# Patient Record
Sex: FEMALE | Race: WHITE | URBAN - METROPOLITAN AREA
[De-identification: names, ages, dates, MRNs, and addresses within clinical notes are randomized per-mention and may not be internally consistent; named-entity substitution may affect disease eponyms.]

---

## 2019-04-24 ENCOUNTER — HOSPITAL ENCOUNTER (OUTPATIENT)
Dept: MEDSURG UNIT | Facility: HOSPITAL | Age: 49
End: 2019-04-26
Attending: SURGERY | Admitting: SURGERY

## 2019-04-24 LAB
ABS NEUT (OLG): 5.21 X10(3)/MCL (ref 2.1–9.2)
ALBUMIN SERPL-MCNC: 4.3 GM/DL (ref 3.4–5)
ALBUMIN/GLOB SERPL: 1.4 RATIO (ref 1–2)
ALP SERPL-CCNC: 143 UNIT/L (ref 45–117)
ALT SERPL-CCNC: 75 UNIT/L (ref 13–56)
APPEARANCE, UA: ABNORMAL
AST SERPL-CCNC: 52 UNIT/L (ref 15–37)
B-HCG SERPL QL: NEGATIVE
BACTERIA SPEC CULT: NORMAL
BASOPHILS # BLD AUTO: 0.05 X10(3)/MCL (ref 0–0.2)
BASOPHILS NFR BLD AUTO: 0.6 % (ref 0–1)
BILIRUB SERPL-MCNC: 0.4 MG/DL (ref 0.2–1)
BILIRUB UR QL STRIP: NEGATIVE
BILIRUBIN DIRECT+TOT PNL SERPL-MCNC: <0.1 MG/DL (ref 0–0.2)
BILIRUBIN DIRECT+TOT PNL SERPL-MCNC: >0.3 MG/DL (ref 0–1)
BUN SERPL-MCNC: 10 MG/DL (ref 7–18)
CALCIUM SERPL-MCNC: 9 MG/DL (ref 8.5–10.1)
CHLORIDE SERPL-SCNC: 105 MMOL/L (ref 98–107)
CO2 SERPL-SCNC: 31 MMOL/L (ref 21–32)
COLOR UR: ABNORMAL
CREAT SERPL-MCNC: 0.86 MG/DL (ref 0.55–1.02)
EOSINOPHIL # BLD AUTO: 0.09 X10(3)/MCL (ref 0–0.9)
EOSINOPHIL NFR BLD AUTO: 1.1 % (ref 0–6.4)
ERYTHROCYTE [DISTWIDTH] IN BLOOD BY AUTOMATED COUNT: 12 % (ref 11.5–17)
GLOBULIN SER-MCNC: 3 GM/DL (ref 2–4)
GLUCOSE (UA): NEGATIVE
GLUCOSE SERPL-MCNC: 105 MG/DL (ref 74–106)
HCT VFR BLD AUTO: 39.5 % (ref 37–47)
HGB BLD-MCNC: 13.7 GM/DL (ref 12–16)
HGB UR QL STRIP: ABNORMAL
IMM GRANULOCYTES # BLD AUTO: 0.03 10*3/UL (ref 0–0.02)
IMM GRANULOCYTES NFR BLD AUTO: 0.4 % (ref 0–0.43)
KETONES UR QL STRIP: NEGATIVE
LEUKOCYTE ESTERASE UR QL STRIP: NEGATIVE
LIPASE SERPL-CCNC: 169 UNIT/L (ref 73–393)
LYMPHOCYTES # BLD AUTO: 2.1 X10(3)/MCL (ref 0.6–4.6)
LYMPHOCYTES NFR BLD AUTO: 26.3 % (ref 16–44)
MCH RBC QN AUTO: 30.1 PG (ref 27–31)
MCHC RBC AUTO-ENTMCNC: 34.7 GM/DL (ref 33–36)
MCV RBC AUTO: 86.8 FL (ref 80–94)
MONOCYTES # BLD AUTO: 0.5 X10(3)/MCL (ref 0.1–1.3)
MONOCYTES NFR BLD AUTO: 6.3 % (ref 4–12.1)
NEUTROPHILS # BLD AUTO: 5.21 X10(3)/MCL (ref 2.1–9.2)
NEUTROPHILS NFR BLD AUTO: 65.3 % (ref 43–73)
NITRITE UR QL STRIP: NEGATIVE
NRBC BLD AUTO-RTO: 0 % (ref 0–0.2)
PH UR STRIP: 8 [PH] (ref 5–7)
PLATELET # BLD AUTO: 318 X10(3)/MCL (ref 130–400)
PMV BLD AUTO: 10 FL (ref 7.4–10.4)
POTASSIUM SERPL-SCNC: 3.4 MMOL/L (ref 3.5–5.1)
PROT SERPL-MCNC: 7.6 GM/DL (ref 6.4–8.2)
PROT UR QL STRIP: ABNORMAL
RBC # BLD AUTO: 4.55 X10(6)/MCL (ref 4.2–5.4)
RBC #/AREA URNS HPF: NORMAL /HPF
SODIUM SERPL-SCNC: 144 MMOL/L (ref 136–145)
SP GR UR STRIP: 1.01 (ref 1–1.03)
SQUAMOUS EPITHELIAL, UA: NORMAL /LPF
TROPONIN I SERPL-MCNC: <0.015 NG/ML (ref 0–0.04)
UROBILINOGEN UR STRIP-ACNC: NEGATIVE
WBC # SPEC AUTO: 8 X10(3)/MCL (ref 4.5–11.5)
WBC #/AREA URNS HPF: NORMAL /HPF

## 2019-04-25 LAB
ABS NEUT (OLG): 9.61 X10(3)/MCL (ref 2.1–9.2)
ALBUMIN SERPL-MCNC: 3.3 GM/DL (ref 3.4–5)
ALBUMIN/GLOB SERPL: 1.3 {RATIO}
ALP SERPL-CCNC: 111 UNIT/L (ref 38–126)
ALT SERPL-CCNC: 59 UNIT/L (ref 12–78)
AST SERPL-CCNC: 33 UNIT/L (ref 15–37)
BASOPHILS # BLD AUTO: 0 X10(3)/MCL (ref 0–0.2)
BASOPHILS NFR BLD AUTO: 0 %
BILIRUB SERPL-MCNC: 1.1 MG/DL (ref 0.2–1)
BILIRUBIN DIRECT+TOT PNL SERPL-MCNC: 0.2 MG/DL (ref 0–0.2)
BILIRUBIN DIRECT+TOT PNL SERPL-MCNC: 0.9 MG/DL (ref 0–0.8)
BUN SERPL-MCNC: 6 MG/DL (ref 7–18)
CALCIUM SERPL-MCNC: 8.5 MG/DL (ref 8.5–10.1)
CHLORIDE SERPL-SCNC: 106 MMOL/L (ref 98–107)
CO2 SERPL-SCNC: 26 MMOL/L (ref 21–32)
CREAT SERPL-MCNC: 0.69 MG/DL (ref 0.55–1.02)
EOSINOPHIL # BLD AUTO: 0.2 X10(3)/MCL (ref 0–0.9)
EOSINOPHIL NFR BLD AUTO: 1 %
ERYTHROCYTE [DISTWIDTH] IN BLOOD BY AUTOMATED COUNT: 12.1 % (ref 11.5–17)
GLOBULIN SER-MCNC: 2.5 GM/DL (ref 2.4–3.5)
GLUCOSE SERPL-MCNC: 109 MG/DL (ref 74–106)
HCT VFR BLD AUTO: 36.6 % (ref 37–47)
HGB BLD-MCNC: 12 GM/DL (ref 12–16)
LYMPHOCYTES # BLD AUTO: 1.6 X10(3)/MCL (ref 0.6–4.6)
LYMPHOCYTES NFR BLD AUTO: 12 %
MCH RBC QN AUTO: 29.3 PG (ref 27–31)
MCHC RBC AUTO-ENTMCNC: 32.8 GM/DL (ref 33–36)
MCV RBC AUTO: 89.5 FL (ref 80–94)
MONOCYTES # BLD AUTO: 1 X10(3)/MCL (ref 0.1–1.3)
MONOCYTES NFR BLD AUTO: 8 %
NEUTROPHILS # BLD AUTO: 9.61 X10(3)/MCL (ref 2.1–9.2)
NEUTROPHILS NFR BLD AUTO: 77 %
PLATELET # BLD AUTO: 247 X10(3)/MCL (ref 130–400)
PMV BLD AUTO: 10.5 FL (ref 9.4–12.4)
POTASSIUM SERPL-SCNC: 3.7 MMOL/L (ref 3.5–5.1)
PROT SERPL-MCNC: 5.8 GM/DL (ref 6.4–8.2)
RBC # BLD AUTO: 4.09 X10(6)/MCL (ref 4.2–5.4)
SODIUM SERPL-SCNC: 137 MMOL/L (ref 136–145)
WBC # SPEC AUTO: 12.5 X10(3)/MCL (ref 4.5–11.5)

## 2019-04-26 LAB
ABS NEUT (OLG): 9.13 X10(3)/MCL (ref 2.1–9.2)
ALBUMIN SERPL-MCNC: 3.1 GM/DL (ref 3.4–5)
ALBUMIN/GLOB SERPL: 1.1 RATIO (ref 1.1–2)
ALP SERPL-CCNC: 99 UNIT/L (ref 38–126)
ALT SERPL-CCNC: 104 UNIT/L (ref 12–78)
AST SERPL-CCNC: 78 UNIT/L (ref 15–37)
BASOPHILS # BLD AUTO: 0 X10(3)/MCL (ref 0–0.2)
BASOPHILS NFR BLD AUTO: 0 %
BILIRUB SERPL-MCNC: 0.7 MG/DL (ref 0.2–1)
BILIRUBIN DIRECT+TOT PNL SERPL-MCNC: 0.2 MG/DL (ref 0–0.5)
BILIRUBIN DIRECT+TOT PNL SERPL-MCNC: 0.5 MG/DL (ref 0–0.8)
BUN SERPL-MCNC: 10 MG/DL (ref 7–18)
CALCIUM SERPL-MCNC: 8.6 MG/DL (ref 8.5–10.1)
CHLORIDE SERPL-SCNC: 106 MMOL/L (ref 98–107)
CO2 SERPL-SCNC: 27 MMOL/L (ref 21–32)
CREAT SERPL-MCNC: 0.72 MG/DL (ref 0.55–1.02)
EOSINOPHIL # BLD AUTO: 0 X10(3)/MCL (ref 0–0.9)
EOSINOPHIL NFR BLD AUTO: 0 %
ERYTHROCYTE [DISTWIDTH] IN BLOOD BY AUTOMATED COUNT: 11.9 % (ref 11.5–17)
GLOBULIN SER-MCNC: 2.8 GM/DL (ref 2.4–3.5)
GLUCOSE SERPL-MCNC: 93 MG/DL (ref 74–106)
HCT VFR BLD AUTO: 33.7 % (ref 37–47)
HGB BLD-MCNC: 11.1 GM/DL (ref 12–16)
LYMPHOCYTES # BLD AUTO: 2 X10(3)/MCL (ref 0.6–4.6)
LYMPHOCYTES NFR BLD AUTO: 16 %
MCH RBC QN AUTO: 29.6 PG (ref 27–31)
MCHC RBC AUTO-ENTMCNC: 32.9 GM/DL (ref 33–36)
MCV RBC AUTO: 89.9 FL (ref 80–94)
MONOCYTES # BLD AUTO: 1 X10(3)/MCL (ref 0.1–1.3)
MONOCYTES NFR BLD AUTO: 8 %
NEUTROPHILS # BLD AUTO: 9.13 X10(3)/MCL (ref 2.1–9.2)
NEUTROPHILS NFR BLD AUTO: 75 %
PLATELET # BLD AUTO: 223 X10(3)/MCL (ref 130–400)
PMV BLD AUTO: 11 FL (ref 9.4–12.4)
POTASSIUM SERPL-SCNC: 3.9 MMOL/L (ref 3.5–5.1)
PROT SERPL-MCNC: 5.9 GM/DL (ref 6.4–8.2)
RBC # BLD AUTO: 3.75 X10(6)/MCL (ref 4.2–5.4)
SODIUM SERPL-SCNC: 141 MMOL/L (ref 136–145)
WBC # SPEC AUTO: 12.2 X10(3)/MCL (ref 4.5–11.5)

## 2022-04-30 NOTE — OP NOTE
DATE OF SURGERY:    04/25/2019    SURGEON:  Alex Cerda MD  ASSISTANT:  Karlene Burkett    PROCEDURE:  Laparoscopic cholecystectomy.    PREOPERATIVE DIAGNOSIS:  Acute cholecystitis with cholecystolithiasis.    POSTOPERATIVE DIAGNOSIS:  Acute cholecystitis with cholecystolithiasis.    ANESTHESIA:  General endotracheal anesthesia with local at sites.    COMPLICATIONS:  None.    ESTIMATED BLOOD LOSS:  50.    CONDITION:  Good.    SPECIMEN:  Gallbladder.    INDICATIONS FOR PROCEDURE:  This is a 48-year-old female with large stones and signs of acute cholecystitis both clinically and on imaging.  She was taken to the operating room for laparoscopic cholecystectomy.    FINDINGS:  Acutely inflamed gallbladder with edema and several greater than 1 cm stones noted and otherwise normal anatomy.    PROCEDURE IN DETAIL:  The patient was brought to the operating room.  She was brought under general endotracheal anesthesia.  She was prepped and draped in sterile fashion.  Antibiotics were given.  SCDs were placed.  Time out was called.  Supraumbilical Veress access to the abdomen was performed with water drop test.  There was no evidence of bowel or trocar injury.  The abdomen was insufflated to 15 mmHg.  Three 5 mm ports and a subxiphoid 11 mm port were placed.  The gallbladder was retracted.  It was very inflamed and distended.  Suction decompression needle was performed in order to grasp the gallbladder more adequately.  The triangle of Calot was dissected out.  There was an obvious cystic artery and cystic duct.  We also clipped the posterior branch of the cystic artery going to the R lateral GB bed.  The cystic artery was clipped twice proximally, once distally, and transected.  The cystic duct was clipped 3 times proximally, once distally, and transected.  The distal side of the posterior branch of the cystic duct was bovied.  The gallbladder was removed from the gallbladder bed with Bovie cautery.  The gallbladder was  removed from the abdomen with the Endo Catch bag.  The liver bed was dry.  The clips were intact.  The remainder of the abdomen appeared normal.  The abdomen was desufflated.  The port sites were removed.  The port sites were closed with 4-0 Vicryl.  The subxiphoid port was closed with 0 Vicryl.  The patient tolerated the procedure well, was awoken without difficulty, and brought to recovery without further event.        ______________________________  MD AQUILES Villagomez/SHIELA  DD:  04/25/2019  Time:  11:37AM  DT:  04/25/2019  Time:  11:52AM  Job #:  [id number]

## 2022-04-30 NOTE — PROGRESS NOTES
Patient:   Nicki Coreas            MRN: [id number]            FIN: [id number]               Age:   48 years     Sex:  Female     :  1970   Associated Diagnoses:   None   Author:   Sailaja Yap      Mrs. Coreas is a 48yoF who is here today for routine post op visit s/p lap joao .  She has been doing well overall since discharge.  She is having issues with constipation, but denies N/V, food intolerance, and diarrhea.  She also reports that her pain is much improved and she is only using ibuprofen sparingly.     Gen: NAD  Card; RRR  Pulm: non labored  Abd: soft, NT/ND, incisons with steri strips still in place    Final Diagnosis  GALLBLADDER, CHOLECYSTECTOMY:    MODERATE ACUTE CHOLECYSTITIS.    CHOLELITHIASIS.    CODE 6    *Electronically Signed*     José Manuel Mancera MD  Verified: 19 10:36      A/P 48yoF s/p lap joao     - avoid hevy lifting for two additional weeks  - RTC PRN    Sailaja Wise PA-C

## 2022-04-30 NOTE — ED PROVIDER NOTES
Patient:   Nicki Coreas            MRN: [id number]            FIN: [id number]               Age:   48 years     Sex:  Female     :  1970   Associated Diagnoses:   Abdominal pain; Cholelithiasis; Nausea; Positive Galan's Sign; Abdominal pain   Author:   Lorraine Cabrera MD      Basic Information   Additional information: Chief Complaint from Nursing Triage Note : Chief Complaint   2019 7:28 CDT       Chief Complaint           c/o bilat upper quad abd pain radiating to back onset 0400 this am.  .      History of Present Illness   The patient presents with abdominal pain.  The onset was 3.5  hours ago.  The course/duration of symptoms is constant.  The character of symptoms is achy.  The degree at onset was moderate.  The Location of pain at onset was bilateral, upper and abdominal.  Radiating pain: back. The relieving factor is none.  Associated symptoms: nausea.        Review of Systems   Constitutional symptoms:  Negative except as documented in HPI.   Skin symptoms:  Negative except as documented in HPI.   Eye symptoms:  Negative except as documented in HPI.   ENMT symptoms:  Negative except as documented in HPI.   Respiratory symptoms:  Negative except as documented in HPI.   Cardiovascular symptoms:  Negative except as documented in HPI.   Gastrointestinal symptoms:  Negative except as documented in HPI.   Genitourinary symptoms:  Negative except as documented in HPI.   Musculoskeletal symptoms:  Negative except as documented in HPI.   Neurologic symptoms:  Negative except as documented in HPI.             Additional review of systems information: All other systems reviewed and otherwise negative.      Health Status   Allergies:    Allergic Reactions (Selected)  No Known Allergies,    Allergies (1) Active Reaction  No Known Allergies None Documented.   Medications:  (Selected)   Prescriptions  Prescribed  prednisONE 20 mg oral tablet: 20 mg = 1 tab(s), Oral, BID, # 10 tab(s), 0  Refill(s)  triamcinolone topical 0.1% cream: 1 lui, TOP, TID, Apply sufficient amount to affected area as needed. Use for no longer than 2 consecutive weeks at a time., # 15 gm, 2 Refill(s)  Documented Medications  Documented  Paxil CR 25 mg oral tablet, extended release: 25 mg = 1 tab(s), Oral, qAM, # 30 tab(s), 0 Refill(s).      Past Medical/ Family/ Social History   Medical history: Negative.   Surgical history:    hyst..   Family history:    No family history items have been selected or recorded..   Social history:    Social & Psychosocial Habits    Alcohol  04/24/2019  Use: Never    Substance Abuse  04/24/2019  Use: Current    Type: Marijuana    Frequency: 1-2 times per week    Tobacco  04/24/2019  Use: Never (less than 100 in l    Patient Wants Consult For Cessation Counseling N/A  .   Problem list:    Active Problems (1)  IBS - Irritable bowel syndrome .      Physical Examination               Vital Signs             Time:  11/23/2015 07:10:00.   Vital Signs   4/24/2019 7:28 CDT       Temperature Oral          36.6 DegC                             Temperature Oral (calculated)             97.88 DegF                             Peripheral Pulse Rate     106 bpm  HI                             Respiratory Rate          20 br/min                             SpO2                      99 %                             Oxygen Therapy            Room air                             Systolic Blood Pressure   182 mmHg  HI                             Diastolic Blood Pressure  87 mmHg  .   Measurements   4/24/2019 7:28 CDT       Weight Dosing             54 kg                             Weight Measured and Calculated in Lbs     119.05 lb                             Weight Estimated          54 kg                             Height/Length Dosing      164 cm                             Height/Length Estimated   164 cm                             Body Mass Index Estimated 20.08 kg/m2  .   General:  Alert, no acute  distress.    Skin:  Warm, dry.    Head:  Normocephalic, atraumatic.    Neck:  Trachea midline.   Eye:  Normal conjunctiva.   Ears, nose, mouth and throat:  Oral mucosa moist.   Cardiovascular:  Regular rate and rhythm, No murmur, No edema.    Respiratory:  Lungs are clear to auscultation, respirations are non-labored, breath sounds are equal.    Gastrointestinal:  Non distended, Tenderness: Moderate, right upper quadrant, Bowel sounds: Normal, Trauma: Negative, Signs: Galan's.    Neurological:  Alert and oriented to person, place, time, and situation, normal speech observed.    Psychiatric:  Cooperative, appropriate mood & affect.       Medical Decision Making   Orders  Launch Order Profile (Selected)   Inpatient Orders  Ordered  EKG Adult 12 Lead: 04/24/19 7:35:00 CDT, Stat, Abdominal Pain Epigastric, 04/24/19 7:35:00 CDT, EKG if >30 years of age and pain at or above umbilicus or history of ischemic heart disease., -1, -1, 04/24/19 7:35:00 CDT  NPO: 04/24/19 7:35:00 CDT, CM NPO  Saline Lock Insert: 04/24/19 7:35:00 CDT, Stop date 04/24/19 7:35:00 CDT  Vital Signs Notify Provider: 04/24/19 7:35:00 CDT, Immediately for BP <90/60., SBP < 90, DBP < 60  Ordered (Dispatched)  CBC w/ Auto Diff: Stat collect, 04/24/19 7:35:00 CDT, Blood, Stop date 04/24/19 7:35:00 CDT, Lab Collect, 04/24/19 7:35:00 CDT  CMP: Stat collect, 04/24/19 7:35:00 CDT, Blood, Stop date 04/24/19 7:35:00 CDT, Lab Collect, 04/24/19 7:35:00 CDT  Lipase Level: Stat collect, 04/24/19 7:35:00 CDT, Blood, Stop date 04/24/19 7:35:00 CDT, Lab Collect, See MD to Nurse order., 04/24/19 7:35:00 CDT  UPT - Urine Pregancy Test: Stat collect, Urine, 04/24/19 7:35:00 CDT, Stop date 04/24/19 7:35:00 CDT, Nurse collect, See MD to Nurse order., Print Label By Order Location, 04/24/19 7:35:00 CDT  Urinalysis with Microscopic if Indicated: Stat collect, Urine, 04/24/19 7:35:00 CDT, Stop date 04/24/19 7:35:00 CDT, Nurse collect, See MD to Nurse order..    Electrocardiogram:  Time 4/24/2019 07:42:00, rate 93, normal sinus rhythm, normal UT & QRS intervals.    Results review:  Lab results : Lab View   4/24/2019 8:13 CDT       U Beta hCG Ql             Negative                             UA Appear                 HAZY                             UA Color                  STRAW                             UA Spec Grav              1.010                             UA Bili                   Negative                             UA pH                     8.0                             UA Urobilinogen           Negative                             UA Blood                  Trace                             UA Glucose                Negative                             UA Ketones                Negative                             UA Protein                Trace                             UA Nitrite                Negative                             UA Leuk Est               Negative                             UA WBC                    0-1 /HPF                             UA RBC                    0-1 /HPF                             UA Bacteria               None Seen                             UA Squam Epithelial       Few /LPF                             UA Amorph PO4             Moderate /HPF    4/24/2019 7:36 CDT       Sodium Lvl                144 mmol/L                             Potassium Lvl             3.4 mmol/L  LOW                             Chloride                  105 mmol/L                             CO2                       31.0 mmol/L                             Calcium Lvl               9.0 mg/dL                             Glucose Lvl               105 mg/dL                             BUN                       10.0 mg/dL                             Creatinine                0.86 mg/dL                             eGFR-AA                   >60 mL/min/1.73 m2  NA                             eGFR-MICAH                  >60 mL/min/1.73 m2  NA                              Bili Total                0.4 mg/dL                             Bili Direct               <0.10 mg/dL                             Bili Indirect             >0.30 mg/dL                             AST                       52 unit/L  HI                             ALT                       75 unit/L  HI                             Alk Phos                  143 unit/L  HI                             Total Protein             7.6 gm/dL                             Albumin Lvl               4.3 gm/dL                             Globulin                  3 gm/dL                             A/G Ratio                 1.4 ratio                             Lipase Lvl                169 unit/L                             Troponin-I                <0.015 ng/mL                             WBC                       8.0 x10(3)/mcL                             RBC                       4.55 x10(6)/mcL                             Hgb                       13.7 gm/dL                             Hct                       39.5 %                             Platelet                  318 x10(3)/mcL                             MCV                       86.8 fL                             MCH                       30.1 pg                             MCHC                      34.7 gm/dL                             RDW                       12.0 %                             MPV                       10.0 fL                             Abs Neut                  5.21 x10(3)/mcL                             Neutro Auto               65.3 %                             Lymph Auto                26.3 %                             Mono Auto                 6.3 %                             Eos Auto                  1.1 %                             Abs Eos                   0.09 x10(3)/mcL                             Basophil Auto             0.6 %                             Abs Neutro                5.21 x10(3)/mcL                              Abs Lymph                 2.10 x10(3)/mcL                             Abs Mono                  0.50 x10(3)/mcL                             Abs Baso                  0.05 x10(3)/mcL                             NRBC%                     0.0 %                             IG%                       0.400 %                             IG#                       0.0300  HI    .   Radiology results:  Rad Results (ST)  < 12 hrs   Accession: [id number]  Order: US Abdomen Limited  Report Dt/Tm: 04/24/2019 09:06  Report:   Comparison: No study for comparison     INDICATION: Right upper quadrant pain.     TECHNIQUE: Real-time sonographic interrogation of the right upper  quadrant was performed using a 4 MHz transducer     FINDINGS:     Inferior vena cava is patent. The included portion of the pancreas is  normal in size and echogenicity. The liver measures 14 cm in a  craniocaudal dimension and contains a simple appearing cyst that  measures 12 mm in diameter. Parenchyma is mildly echogenic.     The common bile duct is at the upper limits of normal for size  measuring 5 mm in intraluminal diameter. The gallbladder is distended  and contains multiple stones that measure 17 and 15 mm in diameter  respectively. The gallbladder wall does not appear thickened. Flow in  the main portal vein is hepatopedal. The right kidney is normal in  size and echogenicity and measures 9 cm in length.     A positive sonographic Galan sign was reported.     IMPRESSION:     Cholelithiasis. The gallbladder is distended and a positive  sonographic Galan sign was reported. Acute cholecystitis is  suspected.     Liver is mildly echogenic compatible with hepatic steatosis. A 12 mm  simple appearing cyst is present in the left lobe.      .      Reexamination/ Reevaluation   Vital signs   Basic Oxygen Information   4/24/2019 7:28 CDT       SpO2                      99 %                             Oxygen Therapy            Room air      Notes: Patient gets minimal relief from dilaudid, pain  returns fairly briskly and never goes away. This combined with findings warrant admit, serial exams, possible HIDA for more definitive evaluation if need be. Patient ordered zofran Q8H.      Impression and Plan   Diagnosis   Abdominal pain (PNED [id number])   Cholelithiasis (JFP59-DG K80.20)   Nausea (CGC89-ZS R11.0)   Positive Galan's Sign (YAA85-RC R19.8)   Abdominal pain (HVW52-GV R10.9)      Calls-Consults   -  4/24/2019 09:27:00 , General Surgeon on call for Cholecystitis (Dr. Ramírez).    Plan   Condition: Stable.    Disposition: Admit time  4/24/2019 09:38:00, Admit to Surgery.

## 2022-05-04 NOTE — HISTORICAL [facility] CERNER
This is a historical note converted from [employer]. Formatting and pictures may have been removed.  Please reference [employer] for original formatting and attached multimedia. Admit and Discharge Dates  Admit Date: 04/24/2019  Discharge Date: 04/26/2019  ?  Physicians  Attending Physician - Desiree CONRAD, Jennifer Webb  Admitting Physician - Desiree CONRAD, Jennifer Webb  Primary Care Physician - No PCP, No  ?  Discharge Diagnosis  Abdominal pain?R10.9  Abdominal pain?[id number]  Cholelithiasis?K80.20  Nausea?R11.0  Positive [place] Sign?R19.8  Surgical Procedures  04/25/2019 - [id number] - Cholecystectomy Laparoscopic  ?  Immunizations  No immunizations recorded for this visit.  ?  Admission Information  48 year old female with a PMH of IBD presented with a 1 day history of abdominal pain. CT and US demonstrated cholelithiasis. The patient was taken to the operating room 4/25 for laparoscopic cholecystectomy. She tolerated the procedure well and post-operatively she tolerated a regular diet. On POD1, she was tolerating a regular diet without nausea or vomiting and her pain was well controlled. She was discharged home in stable condition and instructed to follow up in 2 weeks  Significant Findings  Acute Cholecystitis  Time Spent on discharge  20min  Objective  Vitals & Measurements  T:?36.7? ?C (Oral)? TMIN:?36.6? ?C (Oral)? TMAX:?37.1? ?C (Oral)? HR:?73(Peripheral)? RR:?18? BP:?133/82? SpO2:?99%?  Physical Exam  General: alert, oriented, NAD  HEENT: NCAT, EOMI  Neck: Normal ROM  Resp: No increased WOB  CV: RR  Abd: Soft, ND, appropriately tender, incisions with steri strips, clean/dry/intact  MSK: normal ROM, no abnormalities  Skin: warm, dry  Psych: normal mood and affect  Patient Discharge Condition  stable  Discharge Disposition  home   Discharge Medication Reconciliation  Prescribed  ondansetron (Zofran ODT 4 mg oral tablet, disintegrating)?4 mg, Oral, q8hr, PRN as needed for  nausea/vomiting  oxyCODONE (oxycodone 5 mg oral tablet)?5 mg, Oral, q6hr, PRN pain, moderate  Continue  PARoxetine (paroxetine 20 mg oral tablet)?20 mg, Oral, Daily  paroxetine (Paxil CR 25 mg oral tablet, extended release)?25 mg, Oral, qAM  prednisONE (prednisONE 20 mg oral tablet)?20 mg, Oral, BID  triamcinolone topical (triamcinolone topical 0.1% cream)?1 lui, TOP, TID  ?  Education and Orders Provided  Lap joao Low-Fat Diet for Pancreatitis or Gallbladder Conditions (Custom)  Laparoscopic Cholecystectomy, Care After  Cholecystitis, Easy-to-Read  Discharge - 04/26/19 10:07:00 CDT, Home, Give all scheduled vaccinations prior to discharge.?  Discharge Activity - Activity as Tolerated, No heavy lifting for 2 weeksMay shower?  Discharge Diet - Regular?  ?  Follow up  Surgical Hospitalist Clinic Appointment, on 05/06/2019  ????Keep scheduled appointment  ?      I agree with resident documentation. I was physically present, supervised resident, ?and discussed plan of care.

## 2022-05-04 NOTE — HISTORICAL [facility] CERNER
This is a historical note converted from [employer]. Formatting and pictures may have been removed.  Please reference [employer] for original formatting and attached multimedia. Chief Complaint  c/o bilat upper quad abd pain radiating to back onset 0400 this am.  History of Present Illness  Ms. Coreas is a 48 year old female with a PMH of depression, IBS, and endometriosis who is presenting with abdominal pain since 3:00am this morning. The patient reports the pain started in her right upper quadrant and was sharp in nature. The pain worsened over the next few hours and moved to her left flank and right lower quadrant. She denies any similar pain in the past and has no knowledge of previous issues with her gallbladder. She reports vomiting one time two days ago. She denies any current nausea or vomiting, fevers or chills. She does report burning with urination but no blood or purulence in her urine.  Review of Systems  12 point review of systems negative except for as in HPI  Physical Exam  Vitals & Measurements  T:?36.9? ?C (Oral)? TMIN:?36.6? ?C (Oral)? TMAX:?36.9? ?C (Oral)? HR:?81(Peripheral)? RR:?18? BP:?135/78? SpO2:?98%? WT:?54?kg?  General: alert, oriented, NAD  HEENT: NCAT, EOMI  Neck: Normal ROM  Resp: No increased WOB  CV: RR  Abd: Soft, tender to palpation in RLQ and RUQ. No rebound, no guarding. +CVA tenderness on left.  MSK: normal ROM, no abnormalities  Skin: warm, dry  Psych: normal mood and affect  Assessment/Plan  48 year old female with a 1 day history of abdominal pain  -US performed no conclusive for acute cholecystitis and given patients diffuse abdominal pain and CVA tenderness, will order CT abdomen pelvis with and without contrast  -Patient admitted to surgical hospitalists  -IVF and NPO for now  -prn pain control  -Will follow up CT before operative planning to rule out nephrolithiasis vs. acute cholecystitis vs. appendicitis   Problem List/Past Medical History  Ongoing  Depression  IBS - Irritable  bowel syndrome  Historical  No qualifying data  Procedure/Surgical History  hyst   Medications  Inpatient  Dilaudid, 1 mg= 0.5 mL, IV Push, q4hr, PRN  Protonix 40 mg Vial (IV Push), 40 mg= 1 EA, IV Slow, Daily  Sodium Chloride 0.9% intravenous solution 1,000 mL, 1000 mL, IV  Zofran, 4 mg= 2 mL, IV Push, q4hr, PRN  Zosyn 3.375 gm (for IVPB)  Home  paroxetine 20 mg oral tablet, 20 mg= 1 tab(s), Oral, Daily  Paxil CR 25 mg oral tablet, extended release, 25 mg= 1 tab(s), Oral, qAM,? ?Not Taking per Prescriber  prednisONE 20 mg oral tablet, 20 mg= 1 tab(s), Oral, BID,? ?Not Taking, Completed Rx  triamcinolone topical 0.1% cream, 1 lui, TOP, TID, 2 refills,? ?Not Taking, Completed Rx  Allergies  No Known Allergies  Social History  Alcohol  Never, 04/24/2019  Substance Abuse  Current, Marijuana, 1-2 times per week, 04/24/2019  Tobacco  Never (less than 100 in lifetime), N/A, 04/24/2019  Lab Results  Labs Last 24 Hours?  ?Chemistry? Hematology/Coagulation?   Sodium Lvl: 144 mmol/L (04/24/19 07:36:00) WBC: 8 x10(3)/mcL (04/24/19 07:36:00)   Potassium Lvl:?3.4 mmol/L?Low (04/24/19 07:36:00) RBC: 4.55 x10(6)/mcL (04/24/19 07:36:00)   Chloride: 105 mmol/L (04/24/19 07:36:00) Hgb: 13.7 gm/dL (04/24/19 07:36:00)   CO2: 31 mmol/L (04/24/19 07:36:00) Hct: 39.5 % (04/24/19 07:36:00)   Calcium Lvl: 9 mg/dL (04/24/19 07:36:00) Platelet: 318 x10(3)/mcL (04/24/19 07:36:00)   Glucose Lvl: 105 mg/dL (04/24/19 07:36:00) MCV: 86.8 fL (04/24/19 07:36:00)   BUN: 10 mg/dL (04/24/19 07:36:00) MCH: 30.1 pg (04/24/19 07:36:00)   Creatinine: 0.86 mg/dL (04/24/19 07:36:00) MCHC: 34.7 gm/dL (04/24/19 07:36:00)   eGFR-AA: >60 (04/24/19 07:36:00) RDW: 12 % (04/24/19 07:36:00)   eGFR-MICAH: >60 (04/24/19 07:36:00) MPV: 10 fL (04/24/19 07:36:00)   Bili Total: 0.4 mg/dL (04/24/19 07:36:00) Abs Neut: 5.21 x10(3)/mcL (04/24/19 07:36:00)   Bili Direct: <0.10 (04/24/19 07:36:00) Neutro Auto: 65.3 % (04/24/19 07:36:00)   Bili Indirect: >0.30 (04/24/19 07:36:00)  Lymph Auto: 26.3 % (04/24/19 07:36:00)   AST:?52 unit/L?High (04/24/19 07:36:00) Mono Auto: 6.3 % (04/24/19 07:36:00)   ALT:?75 unit/L?High (04/24/19 07:36:00) Eos Auto: 1.1 % (04/24/19 07:36:00)   Alk Phos:?143 unit/L?High (04/24/19 07:36:00) Abs Eos: 0.09 x10(3)/mcL (04/24/19 07:36:00)   Total Protein: 7.6 gm/dL (04/24/19 07:36:00) Basophil Auto: 0.6 % (04/24/19 07:36:00)   Albumin Lvl: 4.3 gm/dL (04/24/19 07:36:00) Abs Neutro: 5.21 x10(3)/mcL (04/24/19 07:36:00)   Globulin: 3 gm/dL (04/24/19 07:36:00) Abs Lymph: 2.1 x10(3)/mcL (04/24/19 07:36:00)   A/G Ratio: 1.4 ratio (04/24/19 07:36:00) Abs Mono: 0.5 x10(3)/mcL (04/24/19 07:36:00)   Lipase Lvl: 169 unit/L (04/24/19 07:36:00) Abs Baso: 0.05 x10(3)/mcL (04/24/19 07:36:00)   Troponin-I: <0.015 (04/24/19 07:36:00) NRBC%: 0.0 (04/24/19 07:36:00)   U Beta hCG Ql: Negative (04/24/19 08:13:00) IG%: 0.4 % (04/24/19 07:36:00)    IG#:?0.03?High (04/24/19 07:36:00)   ?  ?  Diagnostic Results  Accession:?[id number]  Order:?US Abdomen Limited  Report Dt/Tm:?04/24/2019 09:06  Report:?  Comparison: No study for comparison  ?  INDICATION: Right upper quadrant pain.  ?  TECHNIQUE: Real-time sonographic interrogation of the right upper  quadrant was performed using a 4 MHz transducer  ?  FINDINGS:  ?  Inferior vena cava is patent. The included portion of the pancreas is  normal in size and echogenicity. The liver measures 14 cm in a  craniocaudal dimension and contains a simple appearing cyst that  measures 12 mm in diameter. Parenchyma is mildly echogenic.  ?  The common bile duct is at the upper limits of normal for size  measuring 5 mm in intraluminal diameter. The gallbladder is distended  and contains multiple stones that measure 17 and 15 mm in diameter  respectively. The gallbladder wall does not appear thickened. Flow in  the main portal vein is hepatopedal. The right kidney is normal in  size and echogenicity and measures 9 cm in length.  ?  A positive sonographic  Galan sign was reported.  ?  IMPRESSION:  ?  Cholelithiasis. The gallbladder is distended and a positive  sonographic Galan sign was reported. Acute cholecystitis is  suspected.  ?  Liver is mildly echogenic compatible with hepatic steatosis. A 12 mm  simple appearing cyst is present in the left lobe.  ?  ?  ?      I agree with resident documentation. I was physically present, supervised resident, ?and discussed plan of care.  49yo F with potential early acute cholecystitis, found to have stones on ultrasound, will r/o nephrolithiasis, appendicitis with CT abd/pel  possible cholecystectomy in am